# Patient Record
Sex: MALE | Race: WHITE | NOT HISPANIC OR LATINO | Employment: OTHER | URBAN - NONMETROPOLITAN AREA
[De-identification: names, ages, dates, MRNs, and addresses within clinical notes are randomized per-mention and may not be internally consistent; named-entity substitution may affect disease eponyms.]

---

## 2023-11-21 PROBLEM — I74.9: Status: ACTIVE | Noted: 2023-11-21

## 2023-11-21 PROBLEM — E78.5 HYPERLIPIDEMIA: Status: ACTIVE | Noted: 2023-11-10

## 2023-11-21 PROBLEM — Z86.711 HISTORY OF PULMONARY EMBOLUS (PE): Status: ACTIVE | Noted: 2023-11-21

## 2023-11-21 PROBLEM — E11.9 TYPE 2 DIABETES MELLITUS WITHOUT COMPLICATION, WITHOUT LONG-TERM CURRENT USE OF INSULIN (MULTI): Status: ACTIVE | Noted: 2023-11-10

## 2023-11-21 PROBLEM — E11.9 DIABETES (MULTI): Status: ACTIVE | Noted: 2023-11-21

## 2023-11-21 PROBLEM — I10 ESSENTIAL HYPERTENSION: Status: ACTIVE | Noted: 2023-11-10

## 2023-11-21 RX ORDER — LOSARTAN POTASSIUM 25 MG/1
1 TABLET ORAL DAILY
COMMUNITY
End: 2023-11-22 | Stop reason: ALTCHOICE

## 2023-11-21 RX ORDER — ATENOLOL AND CHLORTHALIDONE TABLET 100; 25 MG/1; MG/1
1 TABLET ORAL DAILY
COMMUNITY

## 2023-11-21 RX ORDER — GLIMEPIRIDE 2 MG/1
2 TABLET ORAL EVERY 12 HOURS
COMMUNITY

## 2023-11-21 RX ORDER — LINAGLIPTIN 5 MG/1
1 TABLET, FILM COATED ORAL DAILY
COMMUNITY

## 2023-11-21 RX ORDER — FENOFIBRATE 145 MG/1
145 TABLET, FILM COATED ORAL DAILY
COMMUNITY

## 2023-11-22 ENCOUNTER — APPOINTMENT (OUTPATIENT)
Dept: CARDIOLOGY | Facility: CLINIC | Age: 68
End: 2023-11-22
Payer: MEDICARE

## 2023-11-22 ENCOUNTER — OFFICE VISIT (OUTPATIENT)
Dept: CARDIOLOGY | Facility: CLINIC | Age: 68
End: 2023-11-22
Payer: MEDICARE

## 2023-11-22 VITALS
SYSTOLIC BLOOD PRESSURE: 132 MMHG | HEIGHT: 70 IN | BODY MASS INDEX: 28.49 KG/M2 | HEART RATE: 48 BPM | WEIGHT: 199 LBS | DIASTOLIC BLOOD PRESSURE: 70 MMHG

## 2023-11-22 DIAGNOSIS — Z79.01 LONG TERM CURRENT USE OF ANTICOAGULANT THERAPY: ICD-10-CM

## 2023-11-22 DIAGNOSIS — E11.9 TYPE 2 DIABETES MELLITUS WITHOUT COMPLICATION, WITHOUT LONG-TERM CURRENT USE OF INSULIN (MULTI): ICD-10-CM

## 2023-11-22 DIAGNOSIS — I10 ESSENTIAL HYPERTENSION: ICD-10-CM

## 2023-11-22 DIAGNOSIS — E78.2 MODERATE MIXED HYPERLIPIDEMIA NOT REQUIRING STATIN THERAPY: ICD-10-CM

## 2023-11-22 DIAGNOSIS — Z86.711 HISTORY OF PULMONARY EMBOLUS (PE): Primary | ICD-10-CM

## 2023-11-22 PROBLEM — I74.9: Status: RESOLVED | Noted: 2023-11-21 | Resolved: 2023-11-22

## 2023-11-22 PROCEDURE — 1159F MED LIST DOCD IN RCRD: CPT | Performed by: NURSE PRACTITIONER

## 2023-11-22 PROCEDURE — 3078F DIAST BP <80 MM HG: CPT | Performed by: NURSE PRACTITIONER

## 2023-11-22 PROCEDURE — 1036F TOBACCO NON-USER: CPT | Performed by: NURSE PRACTITIONER

## 2023-11-22 PROCEDURE — 3075F SYST BP GE 130 - 139MM HG: CPT | Performed by: NURSE PRACTITIONER

## 2023-11-22 PROCEDURE — 1160F RVW MEDS BY RX/DR IN RCRD: CPT | Performed by: NURSE PRACTITIONER

## 2023-11-22 PROCEDURE — 99213 OFFICE O/P EST LOW 20 MIN: CPT | Performed by: NURSE PRACTITIONER

## 2023-11-22 PROCEDURE — 3008F BODY MASS INDEX DOCD: CPT | Performed by: NURSE PRACTITIONER

## 2023-11-22 PROCEDURE — 4010F ACE/ARB THERAPY RXD/TAKEN: CPT | Performed by: NURSE PRACTITIONER

## 2023-11-22 RX ORDER — LOSARTAN POTASSIUM 100 MG/1
100 TABLET ORAL DAILY
COMMUNITY
End: 2023-11-22 | Stop reason: SDUPTHER

## 2023-11-22 RX ORDER — LOSARTAN POTASSIUM 100 MG/1
100 TABLET ORAL DAILY
Qty: 90 TABLET | Refills: 3 | Status: SHIPPED | OUTPATIENT
Start: 2023-11-22

## 2023-11-22 ASSESSMENT — ENCOUNTER SYMPTOMS
IRREGULAR HEARTBEAT: 0
DYSPNEA ON EXERTION: 0
PND: 0
NEAR-SYNCOPE: 0
ORTHOPNEA: 0
SYNCOPE: 0
PALPITATIONS: 0

## 2023-11-22 NOTE — ASSESSMENT & PLAN NOTE
October 2019 PE with catheter-based thrombolysis  [place] to be provoked due to travel  He has completed greater than 1 year of anticoagulation.  Had follow-up with Dr. 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  Continues to deny any type of bleeding events.  Will adjust to prophylactic dose of Xarelto 10 mg daily.

## 2023-11-22 NOTE — PATIENT INSTRUCTIONS
Please bring all medicines, vitamins, and herbal supplements with you when you come to the office.    Prescriptions will not be filled unless you are compliant with your follow up appointments or have a follow up appointment scheduled as per instruction of your physician. Refills should be requested at the time of your visit.     PLAN:   Through informed decision making process incorporating patients unique circumstances, the following treatment plan will be initiated:    1.  Prescription drug management of cardiovascular medication for efficacy, adherence to treatment, side effect assessment and polypharmacy. Current treatment clinically warranted and to continue with following modifications:    -  Xarelto 20mg daily    2. Return for follow-up; in the interim, contact the office if new symptoms arise.  Dr. Wynne annual follow up    3. Blood pressure goal: top number below 140

## 2023-11-22 NOTE — ASSESSMENT & PLAN NOTE
Prophylactic thromboembolic dose of Xarelto 10 mg daily  Due to submassive PE 2019, continued extended travel transporting cars

## 2023-11-22 NOTE — ASSESSMENT & PLAN NOTE
Reports was previously on simvastatin but was transitioned over to Tricor  He did have recent lipids through PCP    No known CAD or PAD.  + diabetic

## 2023-11-22 NOTE — PROGRESS NOTES
"Chief Complaint  \" Doing pretty good\"    Reason for Visit  Annual follow-up  Patient presents to the office today for outpatient follow-up for prior PE with catheter-based thrombolysis, primary prevention.  Last evaluated in clinic by Dr. Wynne November 2022.    Presents today ambulatory with steady gait.  Accompanied by patient  Patient denies any hospitalizations or significant changes to interval medical history since last office follow-up.   He follows routinely with PCP, had annual lab work completed last week.    History of Present Illness   Patient remains an extremely pleasant 68-year-old gentleman who presents without voice cardiovascular complaints.  He continues to be aerobically active where he walks 1 hour daily and rides his bicycle for 30 minutes.  He denies any exertional symptoms, denies any change in exercise capacity or functional tolerance.  He will be spending the next 3 months in [place].    Hypertension: At follow-up with Dr. Wynne last year was transitioned over to valsartan, was difficulty obtaining the medication and he has subsequently been continued on Cozaar 100 mg daily.  His blood pressure was slightly elevated coming into the office today but after sitting 15 minutes was optimal.  He does follow his blood pressure regularly at home, it is an arm cuff but has not been calibrated.    He voiced concerns regarding heart rates been in the 50s.  He denies any dizziness or lightheadedness.  Discussed iatrogenic secondary to atenolol plus aerobically conditioned.    Patient reports that overall has no complaint(s) of chest pain, chest pressure/discomfort, dyspnea, exertional chest pressure/discomfort, irregular heart beat, lower extremity edema, orthopnea, and palpitations    Daily activity:    Daily walking of 1 hour, rides bicycle 30 minutes.  Denies any change in exercise capacity or functional tolerance since last office visit.    The importance of primary prevention reviewed:  HTN: " "Optimal in office  HLD: Currently on Tricor, reports prior dose of statin was discontinued  DM: On ARB, no statin.  Smoker: Denies  BMI:  Reviewed the merits of healthy lifestyle choices on overall cardiovascular health.    No prior ischemic work-up:  Moderate cardiovascular risk profile.  Current daily activity greater than 4 METS without concerning symptoms.    Review of Systems   Cardiovascular:  Negative for chest pain, dyspnea on exertion, irregular heartbeat, leg swelling, near-syncope, orthopnea, palpitations, paroxysmal nocturnal dyspnea and syncope.        Visit Vitals  /70 (BP Location: Right arm, Patient Position: Sitting)   Pulse (!) 48   Ht 1.778 m (5' 10\")   Wt 90.3 kg (199 lb)   BMI 28.55 kg/m²   Smoking Status Never   BSA 2.11 m²     Physical Exam  Vitals and nursing note reviewed.   Constitutional:       Appearance: Normal appearance.   Cardiovascular:      Rate and Rhythm: Normal rate and regular rhythm.      Heart sounds: Normal heart sounds.   Pulmonary:      Effort: Pulmonary effort is normal.      Breath sounds: Normal breath sounds.   Musculoskeletal:      Cervical back: Full passive range of motion without pain.      Right lower leg: No edema.      Left lower leg: No edema.   Skin:     General: Skin is cool.   Neurological:      Mental Status: He is alert and oriented to person, place, and time.   Psychiatric:         Attention and Perception: Attention normal.         Mood and Affect: Mood normal.         Behavior: Behavior is cooperative.        Allergies   Allergen Reactions    Metformin Myalgia        Current Outpatient Medications   Medication Instructions    atenoloL-chlorthalidone (Tenoretic) 100-25 mg tablet 1 tablet, oral, Daily    fenofibrate (TRICOR) 145 mg, oral, Daily    glimepiride (AMARYL) 2 mg, oral, Every 12 hours    linaGLIPtin (Tradjenta) 5 mg tablet 1 tablet, oral, Daily    losartan (COZAAR) 100 mg, oral, Daily    rivaroxaban (XARELTO) 10 mg, oral, Daily    "   Assessment:    Essential hypertension   optimal in office today      Hyperlipidemia  Reports was previously on simvastatin but was transitioned over to Tricor  He did have recent lipids through PCP    No known CAD or PAD.  + diabetic    History of pulmonary embolus (PE)  October 2019 PE with catheter-based thrombolysis  [place] to be provoked due to travel  He has completed greater than 1 year of anticoagulation.  Had follow-up with Dr. 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  Continues to deny any type of bleeding events.  Will adjust to prophylactic dose of Xarelto 10 mg daily.    Long term current use of anticoagulant therapy  Prophylactic thromboembolic dose of Xarelto 10 mg daily  Due to submassive PE 2019, continued extended travel transporting cars    Type 2 diabetes mellitus without complication, without long-term current use of insulin (CMS/[facility])  Remains on ARB  No statin  Unknown hemoglobin A1c      BMI 28.0-28.9,adult  Reviewed the merits of healthy lifestyle choices on overall cardiovascular health.      Cardiac and Vasculature  No prior ischemic work-up    October 2019 TTE  LVEF 60 to 65%  LVH mild  LAE mild    Plan:   Reviewed diet, exercise and weight control.  Recommended sodium restriction.    Through informed decision making process incorporating patients unique circumstances, the following treatment plan will be initiated:    1.  Prescription drug management of cardiovascular medication for efficacy, adherence to treatment, side effect assessment and polypharmacy. Current treatment clinically warranted and to continue with following modifications:    -  Xarelto 20mg daily    2. Return for follow-up; in the interim, contact the office if new symptoms arise.  Dr. Wynne annual follow up    3. Blood pressure goal: top number below 140  Adhering to 2017 AHA/ACC Guideline for the Prevention, Detection,  Evaluation, and Management of High Blood Pressure in Adults:   - Encouraged primary lifestyle modifications including consumption of healthy diet, reduced sodium intake, moderation in alcohol intake, weight loss and increased physical activity.    Zohreh Garibay  MSN, APRN-CNP, PMHNP-BC  [facility]    Please excuse any errors in grammar or translation related to this dictation. Voice recognition software was utilized to prepare this document.

## 2024-08-17 DIAGNOSIS — I10 ESSENTIAL HYPERTENSION: ICD-10-CM

## 2024-08-20 RX ORDER — LOSARTAN POTASSIUM 100 MG/1
100 TABLET ORAL DAILY
Qty: 90 TABLET | Refills: 3 | Status: SHIPPED | OUTPATIENT
Start: 2024-08-20 | End: 2025-08-20

## 2024-11-26 ENCOUNTER — APPOINTMENT (OUTPATIENT)
Dept: CARDIOLOGY | Facility: CLINIC | Age: 69
End: 2024-11-26
Payer: MEDICARE

## 2024-11-26 VITALS
SYSTOLIC BLOOD PRESSURE: 138 MMHG | HEART RATE: 48 BPM | BODY MASS INDEX: 28.56 KG/M2 | DIASTOLIC BLOOD PRESSURE: 80 MMHG | HEIGHT: 71 IN | WEIGHT: 204 LBS

## 2024-11-26 DIAGNOSIS — E11.9 TYPE 2 DIABETES MELLITUS WITHOUT COMPLICATION, WITHOUT LONG-TERM CURRENT USE OF INSULIN (MULTI): ICD-10-CM

## 2024-11-26 DIAGNOSIS — I10 ESSENTIAL HYPERTENSION: ICD-10-CM

## 2024-11-26 DIAGNOSIS — E78.2 MODERATE MIXED HYPERLIPIDEMIA NOT REQUIRING STATIN THERAPY: ICD-10-CM

## 2024-11-26 DIAGNOSIS — Z86.711 HISTORY OF PULMONARY EMBOLUS (PE): ICD-10-CM

## 2024-11-26 DIAGNOSIS — Z78.9 NEVER SMOKED TOBACCO: ICD-10-CM

## 2024-11-26 PROCEDURE — 3008F BODY MASS INDEX DOCD: CPT | Performed by: INTERNAL MEDICINE

## 2024-11-26 PROCEDURE — 1160F RVW MEDS BY RX/DR IN RCRD: CPT | Performed by: INTERNAL MEDICINE

## 2024-11-26 PROCEDURE — 3075F SYST BP GE 130 - 139MM HG: CPT | Performed by: INTERNAL MEDICINE

## 2024-11-26 PROCEDURE — 99213 OFFICE O/P EST LOW 20 MIN: CPT | Performed by: INTERNAL MEDICINE

## 2024-11-26 PROCEDURE — 1159F MED LIST DOCD IN RCRD: CPT | Performed by: INTERNAL MEDICINE

## 2024-11-26 PROCEDURE — 3079F DIAST BP 80-89 MM HG: CPT | Performed by: INTERNAL MEDICINE

## 2024-11-26 PROCEDURE — 4010F ACE/ARB THERAPY RXD/TAKEN: CPT | Performed by: INTERNAL MEDICINE

## 2024-11-26 PROCEDURE — 1036F TOBACCO NON-USER: CPT | Performed by: INTERNAL MEDICINE

## 2024-11-26 RX ORDER — ASPIRIN 81 MG/1
81 TABLET ORAL DAILY
Start: 2024-11-26 | End: 2025-11-26

## 2024-11-26 NOTE — LETTER
"November 26, 2024     Val Tovar, APRN-CNP  [address]  [facility] [address]    Patient: Tre Mir   YOB: 1955   Date of Visit: 11/26/2024       Dear Dr. Val Tovar, APRN-CNP:    Thank you for referring Tre Mir to me for evaluation. Below are my notes for this consultation.  If you have questions, please do not hesitate to call me. I look forward to following your patient along with you.       Sincerely,     Horace Wynne, DO      CC: No Recipients  ______________________________________________________________________________________    Subjective   Tre Mir is a 69 y.o. male       Chief Complaint    Annual Exam          69-year-old gentleman returns for annual follow-up, he is doing very well, active, engaged in exercise, walking and riding bicycle regularly.  Still driving truck.  He sustained a provoked PE in 2019 finally discontinued his Xarelto last year in 2023, total of 4 years on anticoagulant therapy.  He has had no recurrent events.  Blood pressure is under good control he is bradycardic on the atenolol/chlorthalidone combination.    We discussed with him his comorbidities, excellent lifestyle and exercise habits going forward as well as initiating aspirin 81 daily given age, hypertension and diabetes and previous thromboembolic event.  We can follow-up as needed from a cardiology standpoint         Review of Systems   All other systems reviewed and are negative.           Vitals:    11/26/24 0902   BP: 138/80   BP Location: Left arm   Patient Position: Sitting   Pulse: (!) 48   Weight: 92.5 kg (204 lb)   Height: 1.791 m (5' 10.5\")        Objective   Physical Exam  Constitutional:       Appearance: Normal appearance.   HENT:      Nose: Nose normal.   Neck:      Vascular: No carotid bruit.   Cardiovascular:      Rate and Rhythm: Normal rate.      Pulses: Normal pulses.      Heart sounds: Normal heart sounds.   Pulmonary:      Effort: Pulmonary effort is " normal.   Abdominal:      General: Bowel sounds are normal.      Palpations: Abdomen is soft.   Musculoskeletal:         General: Normal range of motion.      Cervical back: Normal range of motion.      Right lower leg: No edema.      Left lower leg: No edema.   Skin:     General: Skin is warm and dry.   Neurological:      General: No focal deficit present.      Mental Status: He is alert.   Psychiatric:         Mood and Affect: Mood normal.         Behavior: Behavior normal.         Thought Content: Thought content normal.         Judgment: Judgment normal.         Allergies  Metformin     Current Medications    Current Outpatient Medications:   •  atenoloL-chlorthalidone (Tenoretic) 100-25 mg tablet, Take 1 tablet by mouth once daily., Disp: , Rfl:   •  fenofibrate (Tricor) 145 mg tablet, Take 1 tablet (145 mg) by mouth once daily., Disp: , Rfl:   •  glimepiride (Amaryl) 2 mg tablet, Take 1 tablet (2 mg) by mouth every 12 hours., Disp: , Rfl:   •  linaGLIPtin (Tradjenta) 5 mg tablet, Take 1 tablet (5 mg) by mouth once daily., Disp: , Rfl:   •  losartan (Cozaar) 100 mg tablet, Take 1 tablet (100 mg) by mouth once daily., Disp: 90 tablet, Rfl: 3                     Assessment/Plan   1. History of pulmonary embolus (PE)  Follow Up In Cardiology      2. Essential hypertension        3. Type 2 diabetes mellitus without complication, without long-term current use of insulin (Multi)        4. Moderate mixed hyperlipidemia not requiring statin therapy        5. BMI 28.0-28.9,adult        6. Never smoked tobacco                 Scribe Attestation  By signing my name below, ARVIND HENSLEY Scribe   attest that this documentation has been prepared under the direction and in the presence of Horace Wynne DO.     Provider Attestation - Scribe documentation    All medical record entries made by the Scribe were at my direction and personally dictated by me. I have reviewed the chart and agree that the record accurately reflects  my personal performance of the history, physical exam, discussion and plan.

## 2024-11-26 NOTE — PROGRESS NOTES
"Subjective   Tre Mir is a 69 y.o. male       Chief Complaint    Annual Exam          69-year-old gentleman returns for annual follow-up, he is doing very well, active, engaged in exercise, walking and riding bicycle regularly.  Still driving truck.  He sustained a provoked PE in 2019 finally discontinued his Xarelto last year in 2023, total of 4 years on anticoagulant therapy.  He has had no recurrent events.  Blood pressure is under good control he is bradycardic on the atenolol/chlorthalidone combination.    We discussed with him his comorbidities, excellent lifestyle and exercise habits going forward as well as initiating aspirin 81 daily given age, hypertension and diabetes and previous thromboembolic event.  We can follow-up as needed from a cardiology standpoint         Review of Systems   All other systems reviewed and are negative.           Vitals:    11/26/24 0902   BP: 138/80   BP Location: Left arm   Patient Position: Sitting   Pulse: (!) 48   Weight: 92.5 kg (204 lb)   Height: 1.791 m (5' 10.5\")        Objective   Physical Exam  Constitutional:       Appearance: Normal appearance.   HENT:      Nose: Nose normal.   Neck:      Vascular: No carotid bruit.   Cardiovascular:      Rate and Rhythm: Normal rate.      Pulses: Normal pulses.      Heart sounds: Normal heart sounds.   Pulmonary:      Effort: Pulmonary effort is normal.   Abdominal:      General: Bowel sounds are normal.      Palpations: Abdomen is soft.   Musculoskeletal:         General: Normal range of motion.      Cervical back: Normal range of motion.      Right lower leg: No edema.      Left lower leg: No edema.   Skin:     General: Skin is warm and dry.   Neurological:      General: No focal deficit present.      Mental Status: He is alert.   Psychiatric:         Mood and Affect: Mood normal.         Behavior: Behavior normal.         Thought Content: Thought content normal.         Judgment: Judgment normal. "         Allergies  Metformin     Current Medications    Current Outpatient Medications:     atenoloL-chlorthalidone (Tenoretic) 100-25 mg tablet, Take 1 tablet by mouth once daily., Disp: , Rfl:     fenofibrate (Tricor) 145 mg tablet, Take 1 tablet (145 mg) by mouth once daily., Disp: , Rfl:     glimepiride (Amaryl) 2 mg tablet, Take 1 tablet (2 mg) by mouth every 12 hours., Disp: , Rfl:     linaGLIPtin (Tradjenta) 5 mg tablet, Take 1 tablet (5 mg) by mouth once daily., Disp: , Rfl:     losartan (Cozaar) 100 mg tablet, Take 1 tablet (100 mg) by mouth once daily., Disp: 90 tablet, Rfl: 3                     Assessment/Plan   1. History of pulmonary embolus (PE)  Follow Up In Cardiology      2. Essential hypertension        3. Type 2 diabetes mellitus without complication, without long-term current use of insulin (Multi)        4. Moderate mixed hyperlipidemia not requiring statin therapy        5. BMI 28.0-28.9,adult        6. Never smoked tobacco                 Scribe Attestation  By signing my name below, ARVIND HENSLEY Scribe   attest that this documentation has been prepared under the direction and in the presence of Horace Wynne DO.     Provider Attestation - Scribe documentation    All medical record entries made by the Scribe were at my direction and personally dictated by me. I have reviewed the chart and agree that the record accurately reflects my personal performance of the history, physical exam, discussion and plan.

## 2024-11-26 NOTE — PATIENT INSTRUCTIONS
Please bring all medicines, vitamins, and herbal supplements with you when you come to the office.    Prescriptions will not be filled unless you are compliant with your follow up appointments or have a follow up appointment scheduled as per instruction of your physician. Refills should be requested at the time of your visit.     BMI was above normal measurement. Current weight: 92.5 kg (204 lb)  Weight change since last visit (-) denotes wt loss 5 lbs   Weight loss needed to achieve BMI 25: 27.6 Lbs  Weight loss needed to achieve BMI 30: -7.6 Lbs  Provided instructions on dietary changes  Provided instructions on exercise.    Follow up ordered as needed only     Start baby aspirin 81 mg daily, can purchase over the counter     Consider checking with Val Tovar NP to see about changing dose of atenolol